# Patient Record
Sex: MALE | Race: WHITE | Employment: OTHER | URBAN - METROPOLITAN AREA
[De-identification: names, ages, dates, MRNs, and addresses within clinical notes are randomized per-mention and may not be internally consistent; named-entity substitution may affect disease eponyms.]

---

## 2017-06-28 ENCOUNTER — HOSPITAL ENCOUNTER (OUTPATIENT)
Dept: NON INVASIVE DIAGNOSTICS | Age: 82
Discharge: HOME | End: 2017-06-28
Attending: INTERNAL MEDICINE
Payer: MEDICARE

## 2017-06-28 DIAGNOSIS — I48.0 PAROXYSMAL ATRIAL FIBRILLATION (HCC): ICD-10-CM

## 2017-06-28 PROCEDURE — 93225 XTRNL ECG REC<48 HRS REC: CPT

## 2017-07-03 NOTE — PROCEDURES
Beto Kahn, [address]       Name:  Bree Clarke   MR#:  [id number]   :  1926   Account #:  [de-identified]        Date of Adm:  2017       PROCEDURE: Holter monitor    INDICATIONS: Bradycardia, chronic atrial fibrillation. FINDINGS: The Holter monitor was carried out from 2017 to   2017. At the end of the recording the patient did not return a   diary. The patient is in atrial fibrillation. There are pauses. At times the   rate goes up and has a maximum rate of 160 to 170. There are   occasional episodes of rates in the 30's, to 36 to 38. There are pauses   as well. These are approximately 3 seconds in duration. Bradycardia   occurs in the 30's. There are times the rate is in the 150's to 160's. Also, pauses in the range of 2.2 seconds occur as well. Maximum   pause is approximately 3.0 seconds. SUMMARY:   1. Atrial fibrillation seen throughout the recording. 2.  Rapid ventricular response with rates 160 to 170 occur. 3.  Multiple pauses 2.5 seconds, one approximately 3.0 second pause. 4.  Rates in the 30's.          MD ANISHA Knight / JOSE   D:  2017   08:25   T:  2017   21:35   Job #:  [id number]     Dr. Radha Osman

## 2017-07-10 ENCOUNTER — APPOINTMENT (OUTPATIENT)
Dept: GENERAL RADIOLOGY | Age: 82
End: 2017-07-10
Attending: INTERNAL MEDICINE
Payer: MEDICARE

## 2017-07-10 ENCOUNTER — HOSPITAL ENCOUNTER (OUTPATIENT)
Dept: CARDIAC CATH/INVASIVE PROCEDURES | Age: 82
Setting detail: OBSERVATION
Discharge: HOME | End: 2017-07-11
Attending: INTERNAL MEDICINE | Admitting: INTERNAL MEDICINE
Payer: MEDICARE

## 2017-07-10 PROBLEM — R00.1 BRADYCARDIA: Status: ACTIVE | Noted: 2017-07-10

## 2017-07-10 LAB — INR BLD: 1.3 (ref 0.9–1.2)

## 2017-07-10 PROCEDURE — 77030018729 HC ELECTRD DEFIB PAD CARD -B

## 2017-07-10 PROCEDURE — C1894 INTRO/SHEATH, NON-LASER: HCPCS

## 2017-07-10 PROCEDURE — 77030018836 HC SOL IRR NACL ICUM -A

## 2017-07-10 PROCEDURE — 77030002996 HC SUT SLK J&J -A

## 2017-07-10 PROCEDURE — 77030031139 HC SUT VCRL2 J&J -A

## 2017-07-10 PROCEDURE — C1898 LEAD, PMKR, OTHER THAN TRANS: HCPCS

## 2017-07-10 PROCEDURE — 74011636320 HC RX REV CODE- 636/320

## 2017-07-10 PROCEDURE — L3670 SO ACRO/CLAV CAN WEB PRE OTS: HCPCS

## 2017-07-10 PROCEDURE — 77030011640 HC PAD GRND REM COVD -A

## 2017-07-10 PROCEDURE — 74011000250 HC RX REV CODE- 250

## 2017-07-10 PROCEDURE — 71010 XR CHEST PORT: CPT

## 2017-07-10 PROCEDURE — 74011250637 HC RX REV CODE- 250/637: Performed by: INTERNAL MEDICINE

## 2017-07-10 PROCEDURE — 74011250636 HC RX REV CODE- 250/636

## 2017-07-10 PROCEDURE — 99218 HC RM OBSERVATION: CPT

## 2017-07-10 PROCEDURE — 85610 PROTHROMBIN TIME: CPT

## 2017-07-10 PROCEDURE — C1892 INTRO/SHEATH,FIXED,PEEL-AWAY: HCPCS

## 2017-07-10 PROCEDURE — 99153 MOD SED SAME PHYS/QHP EA: CPT

## 2017-07-10 PROCEDURE — C1786 PMKR, SINGLE, RATE-RESP: HCPCS

## 2017-07-10 PROCEDURE — 74011250636 HC RX REV CODE- 250/636: Performed by: INTERNAL MEDICINE

## 2017-07-10 PROCEDURE — 74011000250 HC RX REV CODE- 250: Performed by: INTERNAL MEDICINE

## 2017-07-10 RX ORDER — POTASSIUM CHLORIDE 750 MG/1
10 TABLET, FILM COATED, EXTENDED RELEASE ORAL DAILY
Status: DISCONTINUED | OUTPATIENT
Start: 2017-07-11 | End: 2017-07-11 | Stop reason: HOSPADM

## 2017-07-10 RX ORDER — MIDAZOLAM HYDROCHLORIDE 1 MG/ML
INJECTION, SOLUTION INTRAMUSCULAR; INTRAVENOUS
Status: COMPLETED
Start: 2017-07-10 | End: 2017-07-10

## 2017-07-10 RX ORDER — SODIUM CHLORIDE 9 MG/ML
100 INJECTION, SOLUTION INTRAVENOUS CONTINUOUS
Status: DISPENSED | OUTPATIENT
Start: 2017-07-10 | End: 2017-07-10

## 2017-07-10 RX ORDER — CEFAZOLIN SODIUM IN 0.9 % NACL 2 G/100 ML
2 PLASTIC BAG, INJECTION (ML) INTRAVENOUS ONCE
Status: COMPLETED | OUTPATIENT
Start: 2017-07-10 | End: 2017-07-10

## 2017-07-10 RX ORDER — FENTANYL CITRATE 50 UG/ML
INJECTION, SOLUTION INTRAMUSCULAR; INTRAVENOUS
Status: COMPLETED
Start: 2017-07-10 | End: 2017-07-10

## 2017-07-10 RX ORDER — HEPARIN SODIUM 200 [USP'U]/100ML
500 INJECTION, SOLUTION INTRAVENOUS ONCE
Status: DISCONTINUED | OUTPATIENT
Start: 2017-07-10 | End: 2017-07-10

## 2017-07-10 RX ORDER — ACETAMINOPHEN 325 MG/1
650 TABLET ORAL
Status: DISCONTINUED | OUTPATIENT
Start: 2017-07-10 | End: 2017-07-11 | Stop reason: HOSPADM

## 2017-07-10 RX ORDER — METOPROLOL TARTRATE 25 MG/1
25 TABLET, FILM COATED ORAL 2 TIMES DAILY
Status: DISCONTINUED | OUTPATIENT
Start: 2017-07-10 | End: 2017-07-11 | Stop reason: HOSPADM

## 2017-07-10 RX ORDER — SODIUM CHLORIDE 0.9 % (FLUSH) 0.9 %
5-10 SYRINGE (ML) INJECTION AS NEEDED
Status: DISCONTINUED | OUTPATIENT
Start: 2017-07-10 | End: 2017-07-11 | Stop reason: HOSPADM

## 2017-07-10 RX ORDER — CEFAZOLIN SODIUM IN 0.9 % NACL 2 G/100 ML
2 PLASTIC BAG, INJECTION (ML) INTRAVENOUS EVERY 8 HOURS
Status: COMPLETED | OUTPATIENT
Start: 2017-07-10 | End: 2017-07-11

## 2017-07-10 RX ORDER — TERAZOSIN 5 MG/1
5 CAPSULE ORAL DAILY
Status: DISCONTINUED | OUTPATIENT
Start: 2017-07-11 | End: 2017-07-11 | Stop reason: HOSPADM

## 2017-07-10 RX ORDER — CEFAZOLIN SODIUM IN 0.9 % NACL 2 G/100 ML
PLASTIC BAG, INJECTION (ML) INTRAVENOUS
Status: DISCONTINUED
Start: 2017-07-10 | End: 2017-07-10 | Stop reason: ALTCHOICE

## 2017-07-10 RX ORDER — FINASTERIDE 5 MG/1
5 TABLET, FILM COATED ORAL DAILY
Status: DISCONTINUED | OUTPATIENT
Start: 2017-07-11 | End: 2017-07-11 | Stop reason: HOSPADM

## 2017-07-10 RX ORDER — LIDOCAINE HYDROCHLORIDE AND EPINEPHRINE 10; 10 MG/ML; UG/ML
INJECTION, SOLUTION INFILTRATION; PERINEURAL
Status: DISCONTINUED
Start: 2017-07-10 | End: 2017-07-10 | Stop reason: ALTCHOICE

## 2017-07-10 RX ORDER — BACITRACIN 50000 [IU]/1
INJECTION, POWDER, FOR SOLUTION INTRAMUSCULAR
Status: COMPLETED
Start: 2017-07-10 | End: 2017-07-10

## 2017-07-10 RX ORDER — MIDAZOLAM HYDROCHLORIDE 1 MG/ML
.5-2 INJECTION, SOLUTION INTRAMUSCULAR; INTRAVENOUS
Status: DISCONTINUED | OUTPATIENT
Start: 2017-07-10 | End: 2017-07-10 | Stop reason: ALTCHOICE

## 2017-07-10 RX ORDER — GABAPENTIN 300 MG/1
600 CAPSULE ORAL 3 TIMES DAILY
Status: DISCONTINUED | OUTPATIENT
Start: 2017-07-10 | End: 2017-07-11 | Stop reason: HOSPADM

## 2017-07-10 RX ORDER — LIDOCAINE HYDROCHLORIDE 10 MG/ML
INJECTION INFILTRATION; PERINEURAL
Status: DISCONTINUED
Start: 2017-07-10 | End: 2017-07-10 | Stop reason: ALTCHOICE

## 2017-07-10 RX ORDER — SODIUM CHLORIDE 0.9 % (FLUSH) 0.9 %
5-10 SYRINGE (ML) INJECTION EVERY 8 HOURS
Status: DISCONTINUED | OUTPATIENT
Start: 2017-07-10 | End: 2017-07-11 | Stop reason: HOSPADM

## 2017-07-10 RX ORDER — DIGOXIN 125 MCG
0.25 TABLET ORAL DAILY
Status: DISCONTINUED | OUTPATIENT
Start: 2017-07-11 | End: 2017-07-11 | Stop reason: HOSPADM

## 2017-07-10 RX ORDER — HEPARIN SODIUM 200 [USP'U]/100ML
INJECTION, SOLUTION INTRAVENOUS
Status: DISCONTINUED
Start: 2017-07-10 | End: 2017-07-10 | Stop reason: ALTCHOICE

## 2017-07-10 RX ORDER — LIDOCAINE HYDROCHLORIDE AND EPINEPHRINE 10; 10 MG/ML; UG/ML
0-30 INJECTION, SOLUTION INFILTRATION; PERINEURAL ONCE
Status: COMPLETED | OUTPATIENT
Start: 2017-07-10 | End: 2017-07-10

## 2017-07-10 RX ORDER — BACITRACIN 50000 [IU]/1
50000 INJECTION, POWDER, FOR SOLUTION INTRAMUSCULAR ONCE
Status: COMPLETED | OUTPATIENT
Start: 2017-07-10 | End: 2017-07-10

## 2017-07-10 RX ORDER — FUROSEMIDE 20 MG/1
20 TABLET ORAL DAILY
Status: DISCONTINUED | OUTPATIENT
Start: 2017-07-11 | End: 2017-07-11 | Stop reason: HOSPADM

## 2017-07-10 RX ORDER — FENTANYL CITRATE 50 UG/ML
25-50 INJECTION, SOLUTION INTRAMUSCULAR; INTRAVENOUS
Status: DISCONTINUED | OUTPATIENT
Start: 2017-07-10 | End: 2017-07-10 | Stop reason: ALTCHOICE

## 2017-07-10 RX ORDER — HEPARIN SODIUM 200 [USP'U]/100ML
500 INJECTION, SOLUTION INTRAVENOUS ONCE
Status: COMPLETED | OUTPATIENT
Start: 2017-07-10 | End: 2017-07-10

## 2017-07-10 RX ADMIN — FENTANYL CITRATE 50 MCG: 50 INJECTION, SOLUTION INTRAMUSCULAR; INTRAVENOUS at 13:40

## 2017-07-10 RX ADMIN — CEFAZOLIN 2 G: 10 INJECTION, POWDER, FOR SOLUTION INTRAVENOUS; PARENTERAL at 13:39

## 2017-07-10 RX ADMIN — GABAPENTIN 600 MG: 300 CAPSULE ORAL at 20:03

## 2017-07-10 RX ADMIN — Medication 10 ML: at 17:58

## 2017-07-10 RX ADMIN — CEFAZOLIN 2 G: 10 INJECTION, POWDER, FOR SOLUTION INTRAVENOUS; PARENTERAL at 20:03

## 2017-07-10 RX ADMIN — BACITRACIN 50000 UNITS: 50000 INJECTION, POWDER, FOR SOLUTION INTRAMUSCULAR at 14:17

## 2017-07-10 RX ADMIN — GABAPENTIN 600 MG: 300 CAPSULE ORAL at 17:56

## 2017-07-10 RX ADMIN — IOPAMIDOL 30 ML: 755 INJECTION, SOLUTION INTRAVENOUS at 13:59

## 2017-07-10 RX ADMIN — LIDOCAINE HYDROCHLORIDE,EPINEPHRINE BITARTRATE 220 MG: 10; .01 INJECTION, SOLUTION INFILTRATION; PERINEURAL at 14:01

## 2017-07-10 RX ADMIN — MIDAZOLAM HYDROCHLORIDE 2 MG: 1 INJECTION, SOLUTION INTRAMUSCULAR; INTRAVENOUS at 14:21

## 2017-07-10 RX ADMIN — HEPARIN SODIUM 1000 UNITS: 200 INJECTION, SOLUTION INTRAVENOUS at 14:00

## 2017-07-10 RX ADMIN — Medication 10 ML: at 22:24

## 2017-07-10 RX ADMIN — MIDAZOLAM HYDROCHLORIDE 2 MG: 1 INJECTION, SOLUTION INTRAMUSCULAR; INTRAVENOUS at 13:42

## 2017-07-10 RX ADMIN — MIDAZOLAM HYDROCHLORIDE 2 MG: 1 INJECTION INTRAMUSCULAR; INTRAVENOUS at 13:42

## 2017-07-10 RX ADMIN — FENTANYL CITRATE 50 MCG: 50 INJECTION, SOLUTION INTRAMUSCULAR; INTRAVENOUS at 14:21

## 2017-07-10 RX ADMIN — MIDAZOLAM HYDROCHLORIDE 1 MG: 1 INJECTION, SOLUTION INTRAMUSCULAR; INTRAVENOUS at 14:24

## 2017-07-10 RX ADMIN — ACETAMINOPHEN 650 MG: 325 TABLET, FILM COATED ORAL at 22:24

## 2017-07-10 RX ADMIN — METOPROLOL TARTRATE 25 MG: 25 TABLET ORAL at 17:56

## 2017-07-10 NOTE — IP AVS SNAPSHOT
Summary of Care Report The Summary of Care report has been created to help improve care coordination. Users with access to [employer] or [facility] [facility] (Web-based application) may access additional patient information including the Discharge Summary. If you are not currently a [address] user and need more information, please call the number listed below in the [place] [address] section and ask to be connected with Medical Records. Facility Information Name Address Phone [address] [address] [phone] Patient Information Patient Name Sex  Kathrin Stein ([id number]) Male 1926 Discharge Information Admitting Provider Service Area Unit Kenji Chiu MD / Eleni Pratt [facility] / [phone] Discharge Provider Discharge Date/Time Discharge Disposition Destination (none) 2017 (Pending) AHR (none) Patient Language Language ENGLISH [13] Hospital Problems as of 2017  Reviewed: 10/19/2016 10:04 AM by Anil Barnes MD  
  
  
  
 Class Noted - Resolved Last Modified POA Active Problems Bradycardia  7/10/2017 - Present 7/10/2017 by Kenji Chiu MD Unknown Entered by Kenji Chiu MD  
  
Non-Hospital Problems as of 2017  Reviewed: 10/19/2016 10:04 AM by Anil Barnes MD  
  
  
  
 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  
 Overview Signed 5/20/2014 10:08 AM by Alicia Barrientos, DO Necessitates use of iris hooks Neuropathic pain  12/8/2014 - Present 12/8/2014 by Fercho Hernandez Entered by Fercho Hernandez Heel pain  12/8/2014 - Present 12/8/2014 by Fercho Hernandez Entered by Fercho Hernandez Ataxia  12/8/2014 - Present 12/8/2014 by Fercho Hernandez Entered by Fercho Hernandez Pre-diabetes  12/8/2014 - Present 12/8/2014 by Fercho Hernandez Entered by Fercho Hernandez Radiculopathy of lumbosacral region  1/28/2015 - Present 1/28/2015 by Fercho Hernandez Entered by Fercho Hernandez Lumbar radiculopathy  1/28/2015 - Present 1/28/2015 by Darryn Luevano MD  
  Entered by Darryn Luevano MD  
  Spinal stenosis of lumbar region at multiple levels  6/7/2015 - Present 6/7/2015 by Fercho Hernandez Entered by Fercho Hernandez   Degenerative joint disease of right hip  12/2/2015 - Present 12/2/2015 by Hira Clemente MD  
  Entered by Hira Clemente MD  
  Primary localized osteoarthritis of right hip  12/2/2015 - Present 12/2/2015 by Hira Clemente MD  
  Entered by Hira Clemente MD  
  Idiopathic small and large fiber sensory neuropathy  4/21/2016 - Present 4/21/2016 by Darryn Luevano MD  
  Entered by Darryn Luevano MD  
  Diabetic peripheral neuropathy associated with type 2 diabetes mellitus ([facility]  4/21/2016 - Present 4/21/2016 by Darryn Luevano MD  
  Entered by Darryn Luevano MD  
  B12 deficiency  4/21/2016 - Present 4/21/2016 by Darryn Luevano MD  
  Entered by Darryn Luevano MD  
  Benign tumor of spinal meninges ([facility]  4/21/2016 - Present 4/21/2016 by Darryn Luevano MD  
  Entered by Darryn Luevano MD  
  Memory change  4/22/2016 - Present 4/22/2016 by Darryn Luevano MD  
  Entered by Darryn Luevano MD  
  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  
  Entered by Darryn Luevano MD  
 Vitamin D deficiency  4/22/2016 - Present 4/22/2016 by Raven Hernandez MD  
  Entered by Raven Hernandez MD  
  Bile duct dilatation on CT 9 mm  8/31/2016 - Present 8/31/2016 by Curt Garcia MD  
  Entered by Curt Garcia MD  
  RUQ pain  8/31/2016 - Present 8/31/2016 by Curt Garcia MD  
  Entered by Curt Garcia MD  
  
You are allergic to the following No active allergies Current Discharge Medication List  
  
START taking these medications Dose & Instructions Dispensing Information Comments  
 cephALEXin 500 mg capsule Commonly known as:  Frieda [place] Dose:  500 mg Take 1 Cap by mouth three (3) times daily for 3 days. Quantity:  9 Cap Refills:  0 CONTINUE these medications which have NOT CHANGED Dose & Instructions Dispensing Information Comments  
 ferrous sulfate 325 mg (65 mg iron) EC tablet Commonly known as:  IRON Refills:  0  
   
 finasteride 5 mg tablet Commonly known as:  PROSCAR Dose:  5 mg Take 5 mg by mouth daily. Refills:  0  
   
 furosemide 20 mg tablet Commonly known as:  LASIX Dose:  20 mg  
20 mg daily. Refills:  5  
   
 gabapentin 300 mg capsule Commonly known as:  NEURONTIN Dose:  600 mg Take 2 Caps by mouth three (3) times daily. Quantity:  180 Cap Refills:  5 LANOXIN 0.25 mg tablet Generic drug:  digoxin Dose:  0.25 mg Take 0.25 mg by mouth daily. Refills:  0  
   
 metoprolol tartrate 50 mg tablet Commonly known as:  LOPRESSOR Dose:  25 mg Take 25 mg by mouth two (2) times a day. Refills:  0  
   
 potassium chloride SA 10 mEq capsule Commonly known as:  Yaquelin [place] Dose:  10 mEq Take 10 mEq by mouth daily. Refills:  0  
   
 terazosin 5 mg capsule Commonly known as:  HYTRIN Dose:  5 mg Take 5 mg by mouth daily. Indications: HYPERTENSION Refills:  0  
   
 VITAMIN D3 1,000 unit Cap Generic drug:  cholecalciferol Take  by mouth daily. Refills:  0  
   
 warfarin 5 mg tablet Commonly known as:  COUMADIN Dose:  5 mg Take 5 mg by mouth daily. Refills:  0 Follow-up Information Follow up With Details Comments Contact Brenda Farfan MD   Patient can only remember the practice name and not the physician Discharge Instructions Cardiology Discharge Summary Patient ID: 
Kylah Russo [id number] 
7 y.o. 
2/18/1926 Admit Date: 7/10/2017 Discharge Date: 7/11/2017 Admitting Physician: Vadim Merrill MD  
 
 
 
Patient Instructions:  
 
Can resume warfarin tonight Referenced discharge instructions provided by nursing for diet and activity. Follow-up with Dr John Bryant nurse Connie Flanagan  2 weeks [phone] for pacer check. Can f/u with Dr Carlos Sweeney after that Signed: 
Vadim Merrill MD 
7/11/2017 
9:00 AM 
DISCHARGE INSTRUCTIONS FOR PATIENTS WITH PACEMAKERS 1. Remember to call for an appointment in 2 weeks [phone] to check healing and implant programming. 2. Medic Alert Bracelets are available from your pharmacist to wear at all times if you choose to wear one. 3. Carry your ID card for pacemaker with you at all times. This card will be given to you in the hospital or mailed to you. 4. The pacemaker will bulge slightly under your skin. The bulge will decrease in size over the next few weeks. Please notify the doctor's office if you notice any of the following around your site: A.  A bruise that does not go away. B.  Soreness or yellow, green, or brown drainage from the site. C. Any swelling from the site. D. If you have a fever of 100 degrees or higher that lasts for a few days. INCISION CARE 1.  Leave Steri strips  over your site until it starts to fall off, usually in a few weeks. 2.  You may shower after 3 days as long as your incision isnt submerged or directly sprayed upon until well healed. 3.  For comfort, wear loose fitting clothing. 4.  Report any signs of infection, fever, pain, swelling, redness, oozing, or heat at site especially if these symptoms increase after the first 3 to 4 days. ACTIVITY PRECAUTIONS 1. Avoid rough contact with the implant site. 2. No driving for 14 days. 3. Avoid lifting your arm over your head, carrying anything on the affected side, or lifting over 10 pounds for 30 days. For the first 2 days only bend your arm at the elbow. 4. Any extreme activity such as golf, weight lifting or exercise biking should be restricted for 60 days. 5. Do not carry objects by holding them against your implant site. 6.  No shooting rifles or any type of gun with the affected shoulder permanently. SPECIAL PRECAUTIONS 1. You should avoid all strong magnetic fields, such as arc welding, large transformers, large motors. 2.  You may not have an MRI which uses a strong magnet to take pictures. 3.  Treatments or surgery that requires diathermy or electrocautery should be discussed with your doctor before scheduled. 4. Avoid radio frequency transmitters, including radar. 5. Advise dentist or other medical personnel you see that you have a pacemaker. 6.  Cell phones and microwave oven use is okay. 7.  If you plan to move or take a trip to a new area, the doctor's office will give you a name of a doctor to contact for any problems. ANTIBIOTIC THERAPY During the first 8 weeks after your pacemaker insertion, you may need antibiotics before any dental work or certain tests or operations. Let the dentist or doctor who is caring for you know that you have had an implanted device. You will be given a prescription for a prophylactic antibiotic called cephalexin to take for a few days after your procedure. Chart Review Routing History Recipient Method Report Sent By Gali Alcocer MD  
Phone: [phone] In Basket Notes/Transcriptions Bela Ferrera RN [[id number]] 5/14/2014  5:14 PM 05/14/2014 [address] [place] [address],  Fax: [phone] Phone: [phone] Fax Notes/Transcriptions Bela Ferrera RN [[id number]] 5/14/2014  5:14 PM 05/14/2014 Janell Rivera MD  
Phone: [phone] In Basket Notes/Transcriptions Demetrio Bowman RN [[id number]] 5/21/2014 10:25 AM 05/21/2014 [address] [place] [address],  Fax: [phone] Phone: [phone] Fax Notes/Transcriptions Demetrio Bowman RN [[id number]] 5/21/2014 10:25 AM 05/21/2014 [address] [place] [address] Fax: [phone] Fax [facility] CUSTOM LAB REPORT FABRICIO Baptiste [[id number]] 12/4/2015  2:09 PM 12/02/2015

## 2017-07-10 NOTE — IP AVS SNAPSHOT
[address] [facility] 
[phone] Patient: Zuleima Stanford MRN: [id number] :1926 You are allergic to the following No active allergies Recent Documentation Height Weight BMI Smoking Status 1.778 m 72.6 kg 22.96 kg/m2 Former Smoker Emergency Contacts Name Discharge Info Relation Home Work Mobile Ollie Sher 45. CAREGIVER [3] Spouse [3] [phone] About your hospitalization You were admitted on:  July 10, 2017 You last received care in the:  [facility] You were discharged on:  2017 Unit phone number:  [phone] Why you were hospitalized Your primary diagnosis was:  Not on File Your diagnoses also included:  Bradycardia Providers Seen During Your Hospitalizations Provider Role Specialty Primary office phone Lucienne Holstein, MD Attending Provider Cardiology [phone] Your Primary Care Physician (PCP) Primary Care Physician Office Phone Office Fax OTHER, PHYS ** None ** ** None ** Follow-up Information Follow up With Details Comments Contact Info Nerissa Farfan MD   Patient can only remember the practice name and not the physician Current Discharge Medication List  
  
START taking these medications Dose & Instructions Dispensing Information Comments Morning Noon Evening Bedtime  
 cephALEXin 500 mg capsule Commonly known as:  Zeferino Clonts Your last dose was: Your next dose is:    
   
   
 Dose:  500 mg Take 1 Cap by mouth three (3) times daily for 3 days. Quantity:  9 Cap Refills:  0 CONTINUE these medications which have NOT CHANGED Dose & Instructions Dispensing Information Comments Morning Noon Evening Bedtime  
 ferrous sulfate 325 mg (65 mg iron) EC tablet Commonly known as:  IRON  
   
 Your last dose was: Your next dose is:    
   
   
  Refills:  0  
     
   
   
   
  
 finasteride 5 mg tablet Commonly known as:  PROSCAR Your last dose was: Your next dose is:    
   
   
 Dose:  5 mg Take 5 mg by mouth daily. Refills:  0  
     
   
   
   
  
 furosemide 20 mg tablet Commonly known as:  LASIX Your last dose was: Your next dose is:    
   
   
 Dose:  20 mg  
20 mg daily. Refills:  5  
     
   
   
   
  
 gabapentin 300 mg capsule Commonly known as:  NEURONTIN Your last dose was: Your next dose is:    
   
   
 Dose:  600 mg Take 2 Caps by mouth three (3) times daily. Quantity:  180 Cap Refills:  5 LANOXIN 0.25 mg tablet Generic drug:  digoxin Your last dose was: Your next dose is:    
   
   
 Dose:  0.25 mg Take 0.25 mg by mouth daily. Refills:  0  
     
   
   
   
  
 metoprolol tartrate 50 mg tablet Commonly known as:  LOPRESSOR Your last dose was: Your next dose is:    
   
   
 Dose:  25 mg Take 25 mg by mouth two (2) times a day. Refills:  0  
     
   
   
   
  
 potassium chloride SA 10 mEq capsule Commonly known as:  Chun Bancroft Your last dose was: Your next dose is:    
   
   
 Dose:  10 mEq Take 10 mEq by mouth daily. Refills:  0  
     
   
   
   
  
 terazosin 5 mg capsule Commonly known as:  HYTRIN Your last dose was: Your next dose is:    
   
   
 Dose:  5 mg Take 5 mg by mouth daily. Indications: HYPERTENSION Refills:  0  
     
   
   
   
  
 VITAMIN D3 1,000 unit Cap Generic drug:  cholecalciferol Your last dose was: Your next dose is: Take  by mouth daily. Refills:  0  
     
   
   
   
  
 warfarin 5 mg tablet Commonly known as:  COUMADIN Your last dose was: Your next dose is:    
   
   
 Dose:  5 mg Take 5 mg by mouth daily. Refills:  0 Where to Get Your Medications Information on where to get these meds will be given to you by the nurse or doctor. ! Ask your nurse or doctor about these medications  
  cephALEXin 500 mg capsule Discharge Instructions Cardiology Discharge Summary Patient ID: 
Alton Greenfield [id number] 
9 y.o. 
2/18/1926 Admit Date: 7/10/2017 Discharge Date: 7/11/2017 Admitting Physician: Courtney Boothe MD  
 
 
 
Patient Instructions:  
 
Can resume warfarin tonight Referenced discharge instructions provided by nursing for diet and activity. Follow-up with Dr Deyanira Messina nurse Shelli Pride  2 weeks [phone] for pacer check. Can f/u with Dr Yanci Yuan after that Signed: 
Courtney Boothe MD 
7/11/2017 
9:00 AM 
DISCHARGE INSTRUCTIONS FOR PATIENTS WITH PACEMAKERS 1. Remember to call for an appointment in 2 weeks [phone] to check healing and implant programming. 2. Medic Alert Bracelets are available from your pharmacist to wear at all times if you choose to wear one. 3. Carry your ID card for pacemaker with you at all times. This card will be given to you in the hospital or mailed to you. 4. The pacemaker will bulge slightly under your skin. The bulge will decrease in size over the next few weeks. Please notify the doctor's office if you notice any of the following around your site: A.  A bruise that does not go away. B.  Soreness or yellow, green, or brown drainage from the site. C. Any swelling from the site. D. If you have a fever of 100 degrees or higher that lasts for a few days. INCISION CARE 1.  Leave Steri strips  over your site until it starts to fall off, usually in a few weeks. 2.  You may shower after 3 days as long as your incision isnt submerged or directly sprayed upon until well healed. 3.  For comfort, wear loose fitting clothing.  
4.  Report any signs of infection, fever, pain, swelling, redness, oozing, or heat at site especially if these symptoms increase after the first 3 to 4 days. ACTIVITY PRECAUTIONS 1. Avoid rough contact with the implant site. 2. No driving for 14 days. 3. Avoid lifting your arm over your head, carrying anything on the affected side, or lifting over 10 pounds for 30 days. For the first 2 days only bend your arm at the elbow. 4. Any extreme activity such as golf, weight lifting or exercise biking should be restricted for 60 days. 5. Do not carry objects by holding them against your implant site. 6.  No shooting rifles or any type of gun with the affected shoulder permanently. SPECIAL PRECAUTIONS 1. You should avoid all strong magnetic fields, such as arc welding, large transformers, large motors. 2.  You may not have an MRI which uses a strong magnet to take pictures. 3.  Treatments or surgery that requires diathermy or electrocautery should be discussed with your doctor before scheduled. 4. Avoid radio frequency transmitters, including radar. 5. Advise dentist or other medical personnel you see that you have a pacemaker. 6.  Cell phones and microwave oven use is okay. 7.  If you plan to move or take a trip to a new area, the doctor's office will give you a name of a doctor to contact for any problems. ANTIBIOTIC THERAPY During the first 8 weeks after your pacemaker insertion, you may need antibiotics before any dental work or certain tests or operations. Let the dentist or doctor who is caring for you know that you have had an implanted device. You will be given a prescription for a prophylactic antibiotic called cephalexin to take for a few days after your procedure. Discharge Orders None General Information Please provide this summary of care documentation to your next provider. Patient Signature:  ____________________________________________________________ Date:  ____________________________________________________________  
  
Ruby Faes Provider Signature:  ____________________________________________________________ Date:  ____________________________________________________________

## 2017-07-10 NOTE — IP AVS SNAPSHOT
Current Discharge Medication List  
  
START taking these medications Dose & Instructions Dispensing Information Comments Morning Noon Evening Bedtime  
 cephALEXin 500 mg capsule Commonly known as:  Arcenio Veenaely Your last dose was: Your next dose is:    
   
   
 Dose:  500 mg Take 1 Cap by mouth three (3) times daily for 3 days. Quantity:  9 Cap Refills:  0 CONTINUE these medications which have NOT CHANGED Dose & Instructions Dispensing Information Comments Morning Noon Evening Bedtime  
 ferrous sulfate 325 mg (65 mg iron) EC tablet Commonly known as:  IRON Your last dose was: Your next dose is:    
   
   
  Refills:  0  
     
   
   
   
  
 finasteride 5 mg tablet Commonly known as:  PROSCAR Your last dose was: Your next dose is:    
   
   
 Dose:  5 mg Take 5 mg by mouth daily. Refills:  0  
     
   
   
   
  
 furosemide 20 mg tablet Commonly known as:  LASIX Your last dose was: Your next dose is:    
   
   
 Dose:  20 mg  
20 mg daily. Refills:  5  
     
   
   
   
  
 gabapentin 300 mg capsule Commonly known as:  NEURONTIN Your last dose was: Your next dose is:    
   
   
 Dose:  600 mg Take 2 Caps by mouth three (3) times daily. Quantity:  180 Cap Refills:  5 LANOXIN 0.25 mg tablet Generic drug:  digoxin Your last dose was: Your next dose is:    
   
   
 Dose:  0.25 mg Take 0.25 mg by mouth daily. Refills:  0  
     
   
   
   
  
 metoprolol tartrate 50 mg tablet Commonly known as:  LOPRESSOR Your last dose was: Your next dose is:    
   
   
 Dose:  25 mg Take 25 mg by mouth two (2) times a day. Refills:  0  
     
   
   
   
  
 potassium chloride SA 10 mEq capsule Commonly known as:  Delphia Nohemy Your last dose was: Your next dose is:    
   
   
 Dose:  10 mEq Take 10 mEq by mouth daily. Refills:  0  
     
   
   
   
  
 terazosin 5 mg capsule Commonly known as:  HYTRIN Your last dose was: Your next dose is:    
   
   
 Dose:  5 mg Take 5 mg by mouth daily. Indications: HYPERTENSION Refills:  0  
     
   
   
   
  
 VITAMIN D3 1,000 unit Cap Generic drug:  cholecalciferol Your last dose was: Your next dose is: Take  by mouth daily. Refills:  0  
     
   
   
   
  
 warfarin 5 mg tablet Commonly known as:  COUMADIN Your last dose was: Your next dose is:    
   
   
 Dose:  5 mg Take 5 mg by mouth daily. Refills:  0 Where to Get Your Medications Information on where to get these meds will be given to you by the nurse or doctor. ! Ask your nurse or doctor about these medications  
  cephALEXin 500 mg capsule

## 2017-07-11 VITALS
TEMPERATURE: 98.6 F | HEIGHT: 70 IN | HEART RATE: 86 BPM | SYSTOLIC BLOOD PRESSURE: 131 MMHG | WEIGHT: 160 LBS | BODY MASS INDEX: 22.9 KG/M2 | OXYGEN SATURATION: 95 % | RESPIRATION RATE: 16 BRPM | DIASTOLIC BLOOD PRESSURE: 60 MMHG

## 2017-07-11 LAB
ATRIAL RATE: 87 BPM
CALCULATED R AXIS, ECG10: 82 DEGREES
CALCULATED T AXIS, ECG11: 28 DEGREES
DIAGNOSIS, 93000: NORMAL
Q-T INTERVAL, ECG07: 354 MS
QRS DURATION, ECG06: 100 MS
QTC CALCULATION (BEZET), ECG08: 398 MS
VENTRICULAR RATE, ECG03: 76 BPM

## 2017-07-11 PROCEDURE — 74011250636 HC RX REV CODE- 250/636: Performed by: INTERNAL MEDICINE

## 2017-07-11 PROCEDURE — 99218 HC RM OBSERVATION: CPT

## 2017-07-11 PROCEDURE — 74011250637 HC RX REV CODE- 250/637: Performed by: INTERNAL MEDICINE

## 2017-07-11 PROCEDURE — 93005 ELECTROCARDIOGRAM TRACING: CPT

## 2017-07-11 RX ORDER — CEPHALEXIN 500 MG/1
500 CAPSULE ORAL 3 TIMES DAILY
Qty: 9 CAP | Refills: 0 | Status: SHIPPED | OUTPATIENT
Start: 2017-07-11 | End: 2017-07-14

## 2017-07-11 RX ADMIN — CEFAZOLIN 2 G: 10 INJECTION, POWDER, FOR SOLUTION INTRAVENOUS; PARENTERAL at 09:11

## 2017-07-11 RX ADMIN — DIGOXIN 0.25 MG: 125 TABLET ORAL at 08:41

## 2017-07-11 RX ADMIN — FUROSEMIDE 20 MG: 20 TABLET ORAL at 08:41

## 2017-07-11 RX ADMIN — GABAPENTIN 600 MG: 300 CAPSULE ORAL at 08:41

## 2017-07-11 RX ADMIN — POTASSIUM CHLORIDE 10 MEQ: 750 TABLET, FILM COATED, EXTENDED RELEASE ORAL at 08:41

## 2017-07-11 RX ADMIN — METOPROLOL TARTRATE 25 MG: 25 TABLET ORAL at 08:41

## 2017-07-11 RX ADMIN — FINASTERIDE 5 MG: 5 TABLET, FILM COATED ORAL at 08:41

## 2017-07-11 RX ADMIN — TERAZOSIN HYDROCHLORIDE 5 MG: 5 CAPSULE ORAL at 08:41

## 2017-07-11 NOTE — DISCHARGE INSTRUCTIONS
Cardiology Discharge Summary     Patient ID:  Salbador Wilkins  [id number]  48 y.o.  2/18/1926    Admit Date: 7/10/2017    Discharge Date: 7/11/2017     Admitting Physician: Jodi Salgado MD         Patient Instructions:     Can resume warfarin tonight     Referenced discharge instructions provided by nursing for diet and activity. Follow-up with Dr Maria E Gagnon nurse Gaviota Leggett  2 weeks [phone] for pacer check. Can f/u with Dr Carlyn Ashraf after that     Signed:  Jodi Salgado MD  7/11/2017  9:00 AM  DISCHARGE INSTRUCTIONS FOR PATIENTS WITH PACEMAKERS    1. Remember to call for an appointment in 2 weeks [phone] to check healing and implant programming. 2. Medic Alert Bracelets are available from your pharmacist to wear at all times if you choose to wear one. 3. Carry your ID card for pacemaker with you at all times. This card will be given to you in the hospital or mailed to you. 4. The pacemaker will bulge slightly under your skin. The bulge will decrease in size over the next few weeks. Please notify the doctor's office if you notice any of the following around your site:   A.  A bruise that does not go away. B.  Soreness or yellow, green, or brown drainage from the site. C. Any swelling from the site. D. If you have a fever of 100 degrees or higher that lasts for a few days. INCISION CARE       1.  Leave Steri strips  over your site until it starts to fall off, usually in a few weeks. 2.  You may shower after 3 days as long as your incision isnt submerged or directly sprayed upon until well healed. 3.  For comfort, wear loose fitting clothing. 4.  Report any signs of infection, fever, pain, swelling, redness, oozing, or heat at site especially if these symptoms increase after the first 3 to 4 days. ACTIVITY PRECAUTIONS     1. Avoid rough contact with the implant site. 2. No driving for 14 days.   3. Avoid lifting your arm over your head, carrying anything on the affected side, or lifting over 10 pounds for 30 days. For the first 2 days only bend your arm at the elbow. 4. Any extreme activity such as golf, weight lifting or exercise biking should be restricted for 60 days. 5. Do not carry objects by holding them against your implant site. 6.  No shooting rifles or any type of gun with the affected shoulder permanently. SPECIAL PRECAUTIONS     1. You should avoid all strong magnetic fields, such as arc welding, large transformers, large motors. 2.  You may not have an MRI which uses a strong magnet to take pictures. 3.  Treatments or surgery that requires diathermy or electrocautery should be discussed with your doctor before scheduled. 4. Avoid radio frequency transmitters, including radar. 5. Advise dentist or other medical personnel you see that you have a pacemaker. 6.  Cell phones and microwave oven use is okay. 7.  If you plan to move or take a trip to a new area, the doctor's office will give you a name of a doctor to contact for any problems. ANTIBIOTIC THERAPY    During the first 8 weeks after your pacemaker insertion, you may need antibiotics before any dental work or certain tests or operations. Let the dentist or doctor who is caring for you know that you have had an implanted device. You will be given a prescription for a prophylactic antibiotic called cephalexin to take for a few days after your procedure.

## 2017-07-11 NOTE — PROGRESS NOTES
Discharge instructions reviewed with patient and family. Rx reviewed with patient and family. Patient to be transported to front entrance via wheelchair, escorted by volunteer services.

## 2017-07-11 NOTE — PROGRESS NOTES
Patient with standby assist to bathroom, refuses to use cane--very unsteady gait, holding onto objects and door frame to get to bathroom, refusing to let nurse touch him for assistance. RN in room awaiting patient to finish bathroom needs, RN entered bathroom as she visualized patient attempting to get up off toilet. Patient demands nurse leave room and refuses assistance. Will continue to monitor patient on telemetry from outside of room. Patient states \"get out, I will call you when I need you. \"

## 2017-07-11 NOTE — CARDIO/PULMONARY
CP REHAB NOTE    Chart Review: Patient admitted for pacemaker  S/p pacemaker placement 7/10/2017  Medical History: Afib, CAD, HTN, [place]  Former Smoker    Met with patient for post op teaching. Printed material given and discussed re: pacemakers, pacemaker discharge instructions and the TLC diet. Discussed post pacemaker instructions including: restrictions for the affected arm (no raising the arm above shoulder level, no heavy lifting for 30 days), monitoring for infection, avoiding impacts/pressure to the site, avoiding extreme activities, when to call the doctor, use of cell phones and microwaves and avoiding strong magnetic fields. Patient seemed frustrated by sling and it was unclear how much of teaching patient was going to follow. Daughter and wife came in at end of teaching session so all post procedure directions were reviewed with family members. Daughter and wife seemed to clearly understand all teaching. No further questions at this time.

## 2017-07-12 NOTE — OP NOTES
[address] [place] [address] [address]   OP NOTE       Name:  Satish Diana   MR#:  [id number]   :  1926   Account #:  [de-identified]    Surgery Date:  07/10/2017   Date of Adm:  07/10/2017       PREOPERATIVE DIAGNOS:      POSTOPERATIVE DIAGNOS:      PROCEDURES PERFORMED:     1. Left subclavian venogram.   2. Single chamber ventricular permanent pacemaker placement   surgery. SURGEON: Justin Giordano MD    INDICATIONS: Nonreversible, symptomatic sick sinus syndrome and   atrioventricular node disease with chronic atrial fibrillation. ESTIMATED BLOOD LOSS: <30 cc     SPECIMENS REMOVED: None    ANESTHESIA:  Concious sedation     DESCRIPTION OF PROCEDURE: The patient was brought to the   cardiac catheterization lab in a fasting state and after informed consent   was obtained. Electrocardiographic and hemodynamic monitoring were   performed. Sedation was performed by the nurse who was in constant   attendance throughout the procedure. IV Versed and IV fentanyl were   administered from 1:42 p.m. until 2:34 p.m. under the constant   supervision of the attending physician. Lidocaine 1% with epinephrine   was used to anesthetize the left chest wall implant site. The pocket was formed the usual fashion and axillary venous access   was obtained using a micropuncture needle. An 8-Upper sorbian peel away   sheath was placed in the left subclavian vein using standard technique. The right ventricular lead was subsequently advanced to the right   ventricular apex under fluoroscopic guidance. After appropriate   parameters were obtained, the lead was screwed in place in the usual   fashion. This was a Medtronic Model #5076, 58 cm lead, Serial   [id number]. Parameters obtained included an R-wave of 8.9 mV,   impedance of 594 ohms and pacing threshold of 0.6 volts at 0.5 msec   pulse width.  The lead was subsequently anchored to the pocket floor   using 2-0 silk sutures at the anchor sleeve. The pulse generator was then connected to the lead and placed in the   pocket after hemostasis was confirmed. This was a Medtronic Model   Advisa SR MRI device, Model [id number], Serial [id number]. Vigorous irrigation with antibiotic solution was then performed in the   pacemaker pocket, and the pocket was closed using two running 2-0   Vicryl layers with the more superficial layer of running 4-0 Vicryl in a   subcuticular fashion. Final fluoroscopic check revealed adequate   redundancy of the lead and absence of a pneumothorax. Final settings   were in the VVI-R mode with a low rate limit of 60 and an upper rate   limit of 120. PLAN: The patient was to have a followup portable chest x-ray   immediately post procedure and a pacemaker check the morning after   the procedure. The patient was also to receive IV Ancef post   procedure and post discharge followup was to be in approximately 10   days for routine wound and device check.          Saroj Fish MD      [address] [place] [address] / [place]   D:  07/12/2017   17:31   T:  07/12/2017   19:20   Job #:  [id number]

## 2017-10-27 ENCOUNTER — OFFICE VISIT (OUTPATIENT)
Dept: NEUROLOGY | Age: 82
End: 2017-10-27

## 2017-10-27 VITALS
WEIGHT: 175 LBS | DIASTOLIC BLOOD PRESSURE: 62 MMHG | SYSTOLIC BLOOD PRESSURE: 129 MMHG | BODY MASS INDEX: 25.05 KG/M2 | HEART RATE: 96 BPM | HEIGHT: 70 IN | RESPIRATION RATE: 16 BRPM | OXYGEN SATURATION: 97 %

## 2017-10-27 DIAGNOSIS — G60.8 IDIOPATHIC SMALL AND LARGE FIBER SENSORY NEUROPATHY: ICD-10-CM

## 2017-10-27 DIAGNOSIS — M54.17 RADICULOPATHY OF LUMBOSACRAL REGION: ICD-10-CM

## 2017-10-27 DIAGNOSIS — D32.1 BENIGN TUMOR OF SPINAL MENINGES (HCC): ICD-10-CM

## 2017-10-27 DIAGNOSIS — R41.3 MEMORY CHANGE: ICD-10-CM

## 2017-10-27 DIAGNOSIS — F02.80 DEMENTIA OF THE ALZHEIMER'S TYPE WITH LATE ONSET WITHOUT BEHAVIORAL DISTURBANCE (HCC): ICD-10-CM

## 2017-10-27 DIAGNOSIS — E11.42 DIABETIC PERIPHERAL NEUROPATHY ASSOCIATED WITH TYPE 2 DIABETES MELLITUS (HCC): Primary | ICD-10-CM

## 2017-10-27 DIAGNOSIS — G30.1 DEMENTIA OF THE ALZHEIMER'S TYPE WITH LATE ONSET WITHOUT BEHAVIORAL DISTURBANCE (HCC): ICD-10-CM

## 2017-10-27 RX ORDER — GABAPENTIN 600 MG/1
600 TABLET ORAL 3 TIMES DAILY
Qty: 100 TAB | Refills: 11 | Status: SHIPPED | OUTPATIENT
Start: 2017-10-27 | End: 2019-01-02 | Stop reason: SDUPTHER

## 2017-10-27 NOTE — MR AVS SNAPSHOT
Visit Information Date & Time Provider Department Dept. Phone Encounter #  
 10/27/2017  1:40 PM Jordan Bartlett MD [facility] at [facility] [phone] [id number] Follow-up Instructions Return in about 1 year (around 10/27/2018). Upcoming Health Maintenance Date Due  
 LIPID PANEL Q1 2/18/1926 FOOT EXAM Q1 2/18/1936 MICROALBUMIN Q1 2/18/1936 EYE EXAM RETINAL OR DILATED Q1 2/18/1936 DTaP/Tdap/Td series (1 - Tdap) 2/18/1947 ZOSTER VACCINE AGE 60> 12/18/1985 GLAUCOMA SCREENING Q2Y 2/18/1991 Pneumococcal 65+ Low/Medium Risk (1 of 2 - PCV13) 2/18/1991 MEDICARE YEARLY EXAM 2/18/1991 HEMOGLOBIN A1C Q6M 10/21/2016 INFLUENZA AGE 9 TO ADULT 8/1/2017 Allergies as of 10/27/2017  Review Complete On: 10/27/2017 By: Jordan Bartlett MD  
 No Known Allergies Current Immunizations  Never Reviewed No immunizations on file. Not reviewed this visit You Were Diagnosed With   
  
 Codes Comments Diabetic peripheral neuropathy associated with type 2 diabetes mellitus ([facility] [address]    -  Primary ICD-10-CM: E11.42 
ICD-9-CM: 250.60, 357.2 Idiopathic small and large fiber sensory neuropathy     ICD-10-CM: G60.8 ICD-9-CM: 356.4 Benign tumor of spinal meninges (HCC)     ICD-10-CM: D32.1 ICD-9-CM: 225.4 Radiculopathy of lumbosacral region     ICD-10-CM: M54.17 ICD-9-CM: 724.4 Dementia of the Alzheimer's type with late onset without behavioral disturbance     ICD-10-CM: G30.1, F02.80 ICD-9-CM: 331.0, 294.10 Memory change     ICD-10-CM: R41.3 ICD-9-CM: 780.93 Vitals BP Pulse Resp Height(growth percentile) Weight(growth percentile) SpO2  
 129/62 96 16 5' 10\" (1.778 m) 175 lb (79.4 kg) 97% BMI Smoking Status 25.11 kg/m2 Former Smoker Vitals History BMI and BSA Data Body Mass Index Body Surface Area  
 25.11 kg/m 2 1.98 m 2 Preferred Pharmacy Pharmacy Name Phone [facility], [address] [phone] Your Updated Medication List  
  
   
This list is accurate as of: 10/27/17  2:12 PM.  Always use your most recent med list.  
  
  
  
  
 ferrous sulfate 325 mg (65 mg iron) EC tablet Commonly known as:  IRON  
  
 finasteride 5 mg tablet Commonly known as:  PROSCAR Take 5 mg by mouth daily. furosemide 20 mg tablet Commonly known as:  LASIX  
20 mg daily. gabapentin 600 mg tablet Commonly known as:  NEURONTIN Take 1 Tab by mouth three (3) times daily. LANOXIN 0.25 mg tablet Generic drug:  digoxin Take 0.25 mg by mouth daily. metoprolol tartrate 50 mg tablet Commonly known as:  LOPRESSOR Take 25 mg by mouth two (2) times a day. potassium chloride SA 10 mEq capsule Commonly known as:  Rayma Flake Take 10 mEq by mouth daily. terazosin 5 mg capsule Commonly known as:  HYTRIN Take 5 mg by mouth daily. Indications: HYPERTENSION  
  
 VITAMIN D3 1,000 unit Cap Generic drug:  cholecalciferol Take  by mouth daily. warfarin 5 mg tablet Commonly known as:  COUMADIN Take 5 mg by mouth daily. Prescriptions Sent to Pharmacy Refills  
 gabapentin (NEURONTIN) 600 mg tablet 11 Sig: Take 1 Tab by mouth three (3) times daily. Class: Normal  
 Pharmacy: [address], [address] Ph #: [phone] Route: Oral  
  
Follow-up Instructions Return in about 1 year (around 10/27/2018). Patient Instructions A Healthy Lifestyle: Care Instructions Your Care Instructions A healthy lifestyle can help you feel good, stay at a healthy weight, and have plenty of energy for both work and play. A healthy lifestyle is something you can share with your whole family.  
A healthy lifestyle also can lower your risk for serious health problems, such as high blood pressure, heart disease, and diabetes. You can follow a few steps listed below to improve your health and the health of your family. Follow-up care is a key part of your treatment and safety. Be sure to make and go to all appointments, and call your doctor if you are having problems. It's also a good idea to know your test results and keep a list of the medicines you take. How can you care for yourself at home? · Do not eat too much sugar, fat, or fast foods. You can still have dessert and treats now and then. The goal is moderation. · Start small to improve your eating habits. Pay attention to portion sizes, drink less juice and soda pop, and eat more fruits and vegetables. ¨ Eat a healthy amount of food. A 3-ounce serving of meat, for example, is about the size of a deck of cards. Fill the rest of your plate with vegetables and whole grains. ¨ Limit the amount of soda and sports drinks you have every day. Drink more water when you are thirsty. ¨ Eat at least 5 servings of fruits and vegetables every day. It may seem like a lot, but it is not hard to reach this goal. A serving or helping is 1 piece of fruit, 1 cup of vegetables, or 2 cups of leafy, raw vegetables. Have an apple or some carrot sticks as an afternoon snack instead of a candy bar. Try to have fruits and/or vegetables at every meal. 
· Make exercise part of your daily routine. You may want to start with simple activities, such as walking, bicycling, or slow swimming. Try to be active 30 to 60 minutes every day. You do not need to do all 30 to 60 minutes all at once. For example, you can exercise 3 times a day for 10 or 20 minutes. Moderate exercise is safe for most people, but it is always a good idea to talk to your doctor before starting an exercise program. 
· Keep moving. Amanda Bun the lawn, work in the garden, or "[employer] Take the stairs instead of the elevator at work. · If you smoke, quit. People who smoke have an increased risk for heart attack, stroke, cancer, and other lung illnesses. Quitting is hard, but there are ways to boost your chance of quitting tobacco for good. ¨ Use nicotine gum, patches, or lozenges. ¨ Ask your doctor about stop-smoking programs and medicines. ¨ Keep trying. In addition to reducing your risk of diseases in the future, you will notice some benefits soon after you stop using tobacco. If you have shortness of breath or asthma symptoms, they will likely get better within a few weeks after you quit. · Limit how much alcohol you drink. Moderate amounts of alcohol (up to 2 drinks a day for men, 1 drink a day for women) are okay. But drinking too much can lead to liver problems, high blood pressure, and other health problems. Family health If you have a family, there are many things you can do together to improve your health. · Eat meals together as a family as often as possible. · Eat healthy foods. This includes fruits, vegetables, lean meats and dairy, and whole grains. · Include your family in your fitness plan. Most people think of activities such as jogging or tennis as the way to fitness, but there are many ways you and your family can be more active. Anything that makes you breathe hard and gets your heart pumping is exercise. Here are some tips: 
¨ Walk to do errands or to take your child to school or the bus. ¨ Go for a family bike ride after dinner instead of watching TV. Where can you learn more? Go to http://gwen-arturo.info/. Enter O080 in the search box to learn more about \"A Healthy Lifestyle: Care Instructions. \" Current as of: May 12, 2017 Content Version: 11.4 © 5598-3488 [employer]. Care instructions adapted under license by [employer] (which disclaims liability or warranty for this information).  If you have questions about a medical condition or this instruction, always ask your healthcare professional. [address] any warranty or liability for your use of this information. Introducing [facility]! Dear Luisa Nelson: 
Thank you for requesting a [employer] account. Our records indicate that you have previously registered for a [employer] account but its currently inactive. Please call our [employer] support line at [phone]. Additional Information If you have questions, please visit the Frequently Asked Questions section of the [employer] website at [email or web address] Remember, [employer] is NOT to be used for urgent needs. For medical emergencies, dial 911. Now available from your iPhone and Android! Please provide this summary of care documentation to your next provider. Your primary care clinician is listed as Hola Wilson. If you have any questions after today's visit, please call [phone].

## 2017-10-27 NOTE — PATIENT INSTRUCTIONS

## 2017-10-27 NOTE — LETTER
10/27/2017 9:13 PM 
 
Patient:  Vince Goldberg YOB: 1926 Date of Visit: 10/27/2017 Dear No Recipients: Thank you for referring Mr. Vince Goldberg to me for evaluation/treatment. Below are the relevant portions of my assessment and plan of care. Neurology Follow Up Evaluation REFERRED BY: 
Travis Leggett MD 
 
CHIEF COMPLAINT: Spinal mass in lumbar region, increasing numbness in feet, back pain, left pain, recent falls Subjective:  
 
Vince Goldberg is a 80 y.o. right-handed  male, referred by Dr. Mariel Henson, for evaluation of multiple neurological problems and follow-up for his spinal tumor and severe leg weakness and numbness and back pain which is still symptomatic and may be a little worse, but he is able to deal with it. We suggested maybe a repeat MRI scan but he is not sure he wants one because he does not want any type of surgery anyway. . His last office visit was on her ago. He had a MRI of the lumbar spine performed on 05/05/16 that shows \"Unchanged intradural extramedullary brightly enhancing mass at L3. 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 The mass creates moderate to severe spinal stenosis at this level. Unchanged grade 1 anterolisthesis of L4 on L5 with moderate spinal stenosis and mild left neural foraminal narrowing. Unchanged mild spinal stenosis at L5-S1 with mild right neural foraminal narrowing. Unchanged extrahepatic biliary dilatation. \"  He has a known spinal mass in the lumbar region at L3 measuring 13 x 11 x 11 mm in size with severe canal stenosis. He complaints of increasing numbness in his feet that is causing him to limp when he walks. He ambulates with cane, but can walk without assistance for short period of time. He is borderline diabetic, as his last hga1c checked on 04/21/16 was 5.8.  He continues to take Neurontin 600mg three times a day, which is controlling his neuropathic pain. This and refills were sent in for the patient today. He seems to be tolerating this medication with no side effects at this time. He also uses Capsin cream OTC for foot pain relief. He complaints of back and leg pain. He has history of right total hip replacement that helped his hip pain significantly. His bowel and bladder function remain stable at this time He is celebrating his 77 anniversary with his wife today. We discussed possible new medications, but the patient feels that he has enough now and will just continue his current dose of medication Patient reports of recent fall that he suspects is caused from Cymbalta. He denies any broken bones or head trauma from this fall. He discontinued taking the Cymbalta, and noticed that he did not have any falls after discontinuing the medication. Patient is currently on chronic anticoagulation because of atrial fibrillation. He is consistent on his coumadin therapy and has been taking this for 15 years. Patient notes of an incident when he began to have pain on the right side of his rib cage. He began sweating, and reported to the ED for evaluation. He had an abdominal ultrasound performed that was normal.  
He also has some memory loss this seems to be getting slowly worse we will check metabolic parameters trying to find a treatable cause. His wife feels his memory is stable and that his walking is actually getting better after having his hip replacement. He has had no recent imaging of the brain. He's had no new focal weakness, sensory loss, headache, meningismus, trauma, or other precipitating neurological or new neurological symptoms. Past Medical History:  
Diagnosis Date  Arrhythmia   
 hx of A-fib  Arthritis  Atrial fibrillation ([address] Dr Ainsley Benito [phone]  CAD (coronary artery disease)  [place] (hard of hearing) Bilateral hearing aids  Hypertension  Skin cancer Left ear Past Surgical History:  
Procedure Laterality Date  HX CATARACT REMOVAL    
 HX HERNIA REPAIR    
 x2  
 HX OTHER SURGICAL    
 lesion removed right ear  TOTAL KNEE ARTHROPLASTY Left Family History Problem Relation Age of Onset  Heart Disease Mother  Heart Disease Father  COPD Father  Cancer Brother  Other Brother MVA Social History Substance Use Topics  Smoking status: Former Smoker Quit date: 11/23/1970  Smokeless tobacco: Never Used Comment: cigar smoker  Alcohol use No  
   
 
Current Outpatient Prescriptions:  
  gabapentin (NEURONTIN) 600 mg tablet, Take 1 Tab by mouth three (3) times daily. , Disp: 100 Tab, Rfl: 11 
  warfarin (COUMADIN) 5 mg tablet, Take 5 mg by mouth daily. , Disp: , Rfl:  
  ferrous sulfate (IRON) 325 mg (65 mg iron) EC tablet, , Disp: , Rfl: 0 
  furosemide (LASIX) 20 mg tablet, 20 mg daily. , Disp: , Rfl: 5 
  potassium chloride SA (MICRO-K) 10 mEq capsule, Take 10 mEq by mouth daily. , Disp: , Rfl:  
  digoxin (LANOXIN) 0.25 mg tablet, Take 0.25 mg by mouth daily. , Disp: , Rfl:  
  metoprolol (LOPRESSOR) 50 mg tablet, Take 25 mg by mouth two (2) times a day., Disp: , Rfl:  
  terazosin (HYTRIN) 5 mg capsule, Take 5 mg by mouth daily. Indications: HYPERTENSION, Disp: , Rfl:  
  finasteride (PROSCAR) 5 mg tablet, Take 5 mg by mouth daily. , Disp: , Rfl:  
  cholecalciferol (VITAMIN D3) 1,000 unit cap, Take  by mouth daily. , Disp: , Rfl:  
 
 
 
No Known Allergies Review of Systems: A comprehensive review of systems was negative except for: Neurological: positive for coordination problems, gait problems and weakness Vitals:  
 10/27/17 1332 BP: 129/62 Pulse: 96  
Resp: 16 SpO2: 97% Weight: 175 lb (79.4 kg) Height: 5' 10\" (1.778 m) Objective: I 
 
 
NEUROLOGICAL EXAM: 
Appearance:   The patient is fairly developed and nourished, provides a coherent history and is in no acute distress. Mental Status: Oriented to Year and month but not the date of the month, place and person, and the president, cognitive function is abnormal and speech is fluent and no aphasia or dysarthria. Mood and affect appropriate. Cranial Nerves:  Intact visual fields. Fundi are benign. STEVE, EOM's full, no nystagmus, no ptosis. Facial sensation is normal. Corneal reflexes are not tested. Facial movement is symmetric. Hearing is normal bilaterally. Palate is midline with normal sternocleidomastoid and trapezius muscles are normal. Tongue is midline. Neck without meningismus or bruits Motor:  4/5 strength in upper and lower proximal and distal muscles. Normal bulk and tone. No fasciculations. Reflexes:  Deep tendon reflexes 1+/4 and symmetrical. 
No babinski or clonus present Sensory:  Abnormal to touch, pinprick and vibration And temperature in both lower extremities to knee level. DSS is intact Gait:  Abnormal gait as patient needs bilateral canes for ambulation and moves very slowly due to his arthritis and ataxia and neuropathy with sensory ataxia Tremor:  No tremor noted. Cerebellar:  Abnormal Romberg and tandem cerebellar signs present. Neurovascular:  Abnormal heart sounds and irregular rhythm, peripheral pulses decreased, and no carotid bruits. Assessment: ICD-10-CM ICD-9-CM 1. Diabetic peripheral neuropathy associated with type 2 diabetes mellitus ([facility]) E11.42 250.60 gabapentin (NEURONTIN) 600 mg tablet  
  357.2 2. Idiopathic small and large fiber sensory neuropathy G60.8 356.4 gabapentin (NEURONTIN) 600 mg tablet 3. Benign tumor of spinal meninges ([facility]) D32.1 225.4 gabapentin (NEURONTIN) 600 mg tablet 4. Radiculopathy of lumbosacral region M54.17 724.4 gabapentin (NEURONTIN) 600 mg tablet 5. Dementia of the Alzheimer's type with late onset without behavioral disturbance G30.1 331.0 gabapentin (NEURONTIN) 600 mg tablet F02.80 294.10 6. Memory change R41.3 780.93 gabapentin (NEURONTIN) 600 mg tablet Plan: For his increasing numbness in feet, I informed him this could be due to being borderline diabetic. He was advised to watch his diet to control his blood sugar levels. He will continue with Neurontin for nerve pain relief. His medication were renewed for the patient again today and he will continue these because he is having no side effects on the medication and it does provide efficacy. He has a spinal cord tumor that is somewhat ominous, but has not changed on previous imaging, and he does not want imaging done now because he does not want any surgery or intervention at this time unless he worsens. Patient was informed that his back pain in chronic due to history of lumbar stenosis. I encouraged patient to discontinue Cymbalta as this was causing him to fall more frequently. His spinal tumor looks stable, no change in the last year, we will repeat his scan again in one years time or earlier if needed, and he will call if any problem or any progression of neurological symptoms in his legs His MRI scan was reviewed personally on the PACS system today, his old records reviewed also on the chart, and we again discussed the fact that at his age and his with his heart condition he is not a surgical candidate according to neurosurgery and his cardiologist 
He is encouraged to continue his exercise program, and take his vitamins and vitamin D on a daily basis and his medications as prescribed His old records were reviewed in detail, MRI scan is reviewed in detail, these were done on the PACS system. And I concluded that he indeed has a mass in his lumbar spine and needs to be reevaluated to rule out cauda equina syndrome or paraplegia. All medications were reviewed and reconciled in the office today. He is to try to remain both mentally and physically active and try to exercise as much as he can. Patient will return to the office in 12 months for follow up evaluation. Patient is encouraged to call the office sooner if his symptoms persist.  
  
CC: Chika Dumont MD 
FAX: [phone] This note will not be viewable in [address] If you have questions, please do not hesitate to call me. I look forward to following Mr. Evaristo Whaley along with you. Sincerely, Nic Raygoza MD

## 2017-10-28 NOTE — PROGRESS NOTES
Neurology Follow Up Evaluation   REFERRED BY:  Cruz Laguerre MD    CHIEF COMPLAINT: Spinal mass in lumbar region, increasing numbness in feet, back pain, left pain, recent falls       Subjective:     David Bates is a 80 y.o. right-handed  male, referred by Dr. Saint Bitters, for evaluation of multiple neurological problems and follow-up for his spinal tumor and severe leg weakness and numbness and back pain which is still symptomatic and may be a little worse, but he is able to deal with it. We suggested maybe a repeat MRI scan but he is not sure he wants one because he does not want any type of surgery anyway. . His last office visit was on her ago. He had a MRI of the lumbar spine performed on 05/05/16 that shows \"Unchanged intradural extramedullary brightly enhancing mass at L3. 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 The mass creates moderate to severe spinal stenosis at this level. Unchanged grade 1 anterolisthesis of L4 on L5 with moderate spinal stenosis and mild left neural foraminal narrowing. Unchanged mild spinal stenosis at L5-S1 with mild right neural foraminal narrowing. Unchanged extrahepatic biliary dilatation. \"  He has a known spinal mass in the lumbar region at L3 measuring 13 x 11 x 11 mm in size with severe canal stenosis. He complaints of increasing numbness in his feet that is causing him to limp when he walks. He ambulates with cane, but can walk without assistance for short period of time. He is borderline diabetic, as his last hga1c checked on 04/21/16 was 5.8. He continues to take Neurontin 600mg three times a day, which is controlling his neuropathic pain. This and refills were sent in for the patient today. He seems to be tolerating this medication with no side effects at this time. He also uses Capsin cream OTC for foot pain relief. He complaints of back and leg pain.  He has history of right total hip replacement that helped his hip pain significantly. His bowel and bladder function remain stable at this time  He is celebrating his 77 anniversary with his wife today. We discussed possible new medications, but the patient feels that he has enough now and will just continue his current dose of medication  Patient reports of recent fall that he suspects is caused from Cymbalta. He denies any broken bones or head trauma from this fall. He discontinued taking the Cymbalta, and noticed that he did not have any falls after discontinuing the medication. Patient is currently on chronic anticoagulation because of atrial fibrillation. He is consistent on his coumadin therapy and has been taking this for 15 years. Patient notes of an incident when he began to have pain on the right side of his rib cage. He began sweating, and reported to the ED for evaluation. He had an abdominal ultrasound performed that was normal.   He also has some memory loss this seems to be getting slowly worse we will check metabolic parameters trying to find a treatable cause. His wife feels his memory is stable and that his walking is actually getting better after having his hip replacement. He has had no recent imaging of the brain. He's had no new focal weakness, sensory loss, headache, meningismus, trauma, or other precipitating neurological or new neurological symptoms.     Past Medical History:   Diagnosis Date    Arrhythmia     hx of A-fib    Arthritis     Atrial fibrillation ([facility])     Dr Juan Laughter [phone]    CAD (coronary artery disease)     [place] (hard of hearing)     Bilateral hearing aids    Hypertension     Skin cancer     Left ear      Past Surgical History:   Procedure Laterality Date    HX CATARACT REMOVAL      HX HERNIA REPAIR      x2    HX OTHER SURGICAL      lesion removed right ear    TOTAL KNEE ARTHROPLASTY      Left     Family History   Problem Relation Age of Onset    Heart Disease Mother     Heart Disease Father    [facility] COPD Father     Cancer Brother     Other Brother      MVA      Social History   Substance Use Topics    Smoking status: Former Smoker     Quit date: 11/23/1970    Smokeless tobacco: Never Used      Comment: cigar smoker    Alcohol use No         Current Outpatient Prescriptions:     gabapentin (NEURONTIN) 600 mg tablet, Take 1 Tab by mouth three (3) times daily. , Disp: 100 Tab, Rfl: 11    warfarin (COUMADIN) 5 mg tablet, Take 5 mg by mouth daily. , Disp: , Rfl:     ferrous sulfate (IRON) 325 mg (65 mg iron) EC tablet, , Disp: , Rfl: 0    furosemide (LASIX) 20 mg tablet, 20 mg daily. , Disp: , Rfl: 5    potassium chloride SA (MICRO-K) 10 mEq capsule, Take 10 mEq by mouth daily. , Disp: , Rfl:     digoxin (LANOXIN) 0.25 mg tablet, Take 0.25 mg by mouth daily. , Disp: , Rfl:     metoprolol (LOPRESSOR) 50 mg tablet, Take 25 mg by mouth two (2) times a day., Disp: , Rfl:     terazosin (HYTRIN) 5 mg capsule, Take 5 mg by mouth daily. Indications: HYPERTENSION, Disp: , Rfl:     finasteride (PROSCAR) 5 mg tablet, Take 5 mg by mouth daily. , Disp: , Rfl:     cholecalciferol (VITAMIN D3) 1,000 unit cap, Take  by mouth daily. , Disp: , Rfl:         No Known Allergies     Review of Systems:  A comprehensive review of systems was negative except for: Neurological: positive for coordination problems, gait problems and weakness   Vitals:    10/27/17 1332   BP: 129/62   Pulse: 96   Resp: 16   SpO2: 97%   Weight: 175 lb (79.4 kg)   Height: 5' 10\" (1.778 m)     Objective:     I      NEUROLOGICAL EXAM:  Appearance: The patient is fairly developed and nourished, provides a coherent history and is in no acute distress. Mental Status: Oriented to Year and month but not the date of the month, place and person, and the president, cognitive function is abnormal and speech is fluent and no aphasia or dysarthria. Mood and affect appropriate. Cranial Nerves:  Intact visual fields. Fundi are benign.  STEVE, EOM's full, no nystagmus, no ptosis. Facial sensation is normal. Corneal reflexes are not tested. Facial movement is symmetric. Hearing is normal bilaterally. Palate is midline with normal sternocleidomastoid and trapezius muscles are normal. Tongue is midline. Neck without meningismus or bruits   Motor:  4/5 strength in upper and lower proximal and distal muscles. Normal bulk and tone. No fasciculations. Reflexes:  Deep tendon reflexes 1+/4 and symmetrical.  No babinski or clonus present   Sensory:  Abnormal to touch, pinprick and vibration And temperature in both lower extremities to knee level. DSS is intact   Gait:  Abnormal gait as patient needs bilateral canes for ambulation and moves very slowly due to his arthritis and ataxia and neuropathy with sensory ataxia   Tremor:  No tremor noted. Cerebellar:  Abnormal Romberg and tandem cerebellar signs present. Neurovascular:  Abnormal heart sounds and irregular rhythm, peripheral pulses decreased, and no carotid bruits. Assessment:       ICD-10-CM ICD-9-CM    1. Diabetic peripheral neuropathy associated with type 2 diabetes mellitus ([facility]) E11.42 250.60 gabapentin (NEURONTIN) 600 mg tablet     357.2    2. Idiopathic small and large fiber sensory neuropathy G60.8 356.4 gabapentin (NEURONTIN) 600 mg tablet   3. Benign tumor of spinal meninges ([facility]) D32.1 225.4 gabapentin (NEURONTIN) 600 mg tablet   4. Radiculopathy of lumbosacral region M54.17 724.4 gabapentin (NEURONTIN) 600 mg tablet   5. 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 Memory change R41.3 780.93 gabapentin (NEURONTIN) 600 mg tablet       Plan: For his increasing numbness in feet, I informed him this could be due to being borderline diabetic. He was advised to watch his diet to control his blood sugar levels. He will continue with Neurontin for nerve pain relief.   His medication were renewed for the patient again today and he will continue these because he is having no side effects on the medication and it does provide efficacy. He has a spinal cord tumor that is somewhat ominous, but has not changed on previous imaging, and he does not want imaging done now because he does not want any surgery or intervention at this time unless he worsens. Patient was informed that his back pain in chronic due to history of lumbar stenosis. I encouraged patient to discontinue Cymbalta as this was causing him to fall more frequently. His spinal tumor looks stable, no change in the last year, we will repeat his scan again in one years time or earlier if needed, and he will call if any problem or any progression of neurological symptoms in his legs  His MRI scan was reviewed personally on the PACS system today, his old records reviewed also on the chart, and we again discussed the fact that at his age and his with his heart condition he is not a surgical candidate according to neurosurgery and his cardiologist  He is encouraged to continue his exercise program, and take his vitamins and vitamin D on a daily basis and his medications as prescribed  His old records were reviewed in detail, MRI scan is reviewed in detail, these were done on the PACS system. And I concluded that he indeed has a mass in his lumbar spine and needs to be reevaluated to rule out cauda equina syndrome or paraplegia. All medications were reviewed and reconciled in the office today. He is to try to remain both mentally and physically active and try to exercise as much as he can. Patient will return to the office in 12 months for follow up evaluation. Patient is encouraged to call the office sooner if his symptoms persist.      CC: Berto Powell MD  FAX: [phone]    This note will not be viewable in [address]

## 2019-01-02 ENCOUNTER — OFFICE VISIT (OUTPATIENT)
Dept: NEUROLOGY | Age: 84
End: 2019-01-02

## 2019-01-02 VITALS
HEIGHT: 70 IN | HEART RATE: 86 BPM | SYSTOLIC BLOOD PRESSURE: 122 MMHG | WEIGHT: 182 LBS | DIASTOLIC BLOOD PRESSURE: 78 MMHG | OXYGEN SATURATION: 98 % | BODY MASS INDEX: 26.05 KG/M2

## 2019-01-02 DIAGNOSIS — E11.42 DIABETIC PERIPHERAL NEUROPATHY ASSOCIATED WITH TYPE 2 DIABETES MELLITUS (HCC): Primary | ICD-10-CM

## 2019-01-02 DIAGNOSIS — M48.061 SPINAL STENOSIS OF LUMBAR REGION AT MULTIPLE LEVELS: ICD-10-CM

## 2019-01-02 RX ORDER — GABAPENTIN 800 MG/1
800 TABLET ORAL 3 TIMES DAILY
COMMUNITY
Start: 2018-11-01

## 2019-01-02 NOTE — PATIENT INSTRUCTIONS
Office Policies · Phone calls/patient messages: Please allow up to 24 hours for someone in the office to contact you about your call or message. Be mindful your provider may be out of the office or your message may require further review. We encourage you to use [employer] for your messages as this is a faster, more efficient way to communicate with our office· Medication Refills: 
Prescription medications require up to 48 business hours to process. We encourage you to use [facility] for your refills. For controlled medications: Please allow up to 72 business hours to process. Certain medications may require you to  a written prescription at our office. NO narcotic/controlled medications will be prescribed after 4pm Monday through Friday or on weekends· Form/Paperwork Completion: 
Please note there is a $25 fee for all paperwork completed by our providers. We ask that you allow 7-14 business days. Pre-payment is due prior to picking up/faxing the completed form. You may also download your forms to [employer] to have your doctor print off. Diabetic Neuropathy: Care Instructions Your Care Instructions When you have diabetes, your blood sugar level may get too high. Over time, high blood sugar levels can damage nerves. This is called diabetic neuropathy. Nerve damage can cause pain, burning, tingling, and numbness and may leave you feeling weak. The feet are often affected. When you have nerve damage in your feet, you cannot feel your feet and toes as well as normal and may not notice cuts or sores. Even a small injury can lead to a serious infection. It is very important that you follow your doctor's advice on foot care. Sometimes diabetes damages nerves that help the body function. If this happens, your blood pressure, sweating, digestion, and urination might be affected.  Your doctor may give you a target blood sugar level that is higher or lower than you are used to. Try to keep your blood sugar very close to this target level to prevent more damage. Follow-up care is a key part of your treatment and safety. Be sure to make and go to all appointments, and call your doctor if you are having problems. It's also a good idea to know your test results and keep a list of the medicines you take. How can you care for yourself at home? · Take your medicines exactly as prescribed. Call your doctor if you think you are having a problem with your medicine. It is very important that you take your insulin or diabetes pills as your doctor tells you. · Try to keep blood sugar at your target level. ? Eat a variety of healthy foods, with carbohydrate spread out in your meals. A dietitian can help you plan meals. ? Try to get at least 30 minutes of exercise on most days. ? Check your blood sugar as many times each day as your doctor recommends. · Take and record your blood pressure at home if your doctor tells you to. Learn the importance of the two measures of blood pressure (such as 130 over 80, or 130/80). To take your blood pressure at home: ? Ask your doctor to check your blood pressure monitor to be sure it is accurate and the cuff fits you. Also ask your doctor to watch you to make sure that you are using it right. ? Do not use medicine known to raise blood pressure (such as some nasal decongestant sprays) before taking your blood pressure. ? Avoid taking your blood pressure if you have just exercised or are nervous or upset. Rest at least 15 minutes before you take a reading. · Take pain medicines exactly as directed. ? If the doctor gave you a prescription medicine for pain, take it as prescribed. ? If you are not taking a prescription pain medicine, ask your doctor if you can take an over-the-counter medicine. · Do not smoke.  Smoking can increase your chance for a heart attack or stroke. If you need help quitting, talk to your doctor about stop-smoking programs and medicines. These can increase your chances of quitting for good. · Limit alcohol to 2 drinks a day for men and 1 drink a day for women. Too much alcohol can cause health problems. · Eat small meals often, rather than 2 or 3 large meals a day. To care for your feet · Prevent injury by wearing shoes at all times, even when you are indoors. · Do foot care as part of your daily routine. Wash your feet and then rub lotion on your feet, but not between your toes. Use a handheld mirror or magnifying mirror to inspect your feet for blisters, cuts, cracks, or sores. · Have your toenails trimmed and filed straight across. · Wear shoes and socks that fit well. Soft shoes that have good support and that fit well (such as tennis shoes) are best for your feet. · Check your shoes for any loose objects or rough edges before you put them on. · Ask your doctor to check your feet during each visit. Your doctor may notice a foot problem you have missed. · Get early treatment for any foot problem, even a minor one. When should you call for help? Call your doctor now or seek immediate medical care if: 
  · You have symptoms of infection, such as: 
? Increased pain, swelling, warmth, or redness. ? Red streaks leading from the area. ? Pus draining from the area. ? A fever.  
  · You have new or worse numbness, pain, or tingling in any part of your body.  
 Watch closely for changes in your health, and be sure to contact your doctor if: 
  · You have a new problem with your feet, such as: ? A new sore or ulcer. ? A break in the skin that is not healing after several days. ? Bleeding corns or calluses. ? An ingrown toenail.  
  · You do not get better as expected. Where can you learn more? Go to http://gwen-arturo.info/. Enter T137 in the search box to learn more about \"Diabetic Neuropathy: Care Instructions. \" 
 Current as of: December 7, 2017 Content Version: 11.8 © 6724-0493 [employer]. Care instructions adapted under license by [employer] (which disclaims liability or warranty for this information). If you have questions about a medical condition or this instruction, always ask your healthcare professional. [address] any warranty or liability for your use of this information.

## 2019-01-02 NOTE — PROGRESS NOTES
[facility] at [address] [facility], [address], [address] 
[phone] (E) [phone] (F)   
  
  2019 Name:  Liu Chi 
:  1926 MRN:  [id number] PCP:  Annamarie Torres MD  
 
 
Chief Complaint Chief Complaint Patient presents with  Follow-up  
  neuropathy Cliff Nichole is a 80 y.o. male who presents for follow up of peripheral neuropathy. He is accompanied by  his wife. History of Present Illness Patient's last visit was on 10/27/17. He takes Gabapentin 800 mg TID (was increased from 600 mg TID about 2 months ago) and he reports the increased dose has helped relieve his symptoms. He denies any side effects from the Gabapentin. He states he has trouble describing the feeling he gets in his feet, he states it is like \"he has a vice around his feet and he is trying to walk on a balloon or in a mud hole\". Symptoms in left foot are worse than his right. He tripped once about one month ago, but did not fall down. He denies any injury. He denies any numbness or tingling. He has occasional mild lower back pain after walking around, but he denies any current back pain. He states he has always had general weakness but he denies any change in this since last visit. He uses a cane to walk at home (he held on to his wife's wheelchair today when walking in). He still drives and denies any problem with this. He has borderline diabetes. He sees his PCP twice per year. Has arthritis in left shoulder and decreased ROM due to this. He denies any decline in his memory since his last visit. He denies any dizziness or headaches. The patient was a farmer and raised chicken and cattle. The patient states he can not have an MRI due to pacemaker placed about one year ago.   He knows about the mass in his lumbar spine and he states about two years ago he saw a spine specialist who offered injections in his spine, but he declined this. He does not want any surgery on his back or further evaluation of this. His last lumbar spine MRI was 5/5/16 and showed,  
1. Unchanged intradural extramedullary brightly enhancing mass at L3. This  
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  
paraganglioma less likely. The mass creates moderate to severe spinal  
stenosis at this level. 2. Unchanged grade 1 anterolisthesis of L4 on L5 with moderate spinal  
stenosis and mild left neural foraminal narrowing. 3. Unchanged mild spinal stenosis at L5-S1 with mild right neural foraminal  
narrowing. 4. Unchanged extrahepatic biliary dilatation. Current Outpatient Medications Medication Sig  
 gabapentin (NEURONTIN) 800 mg tablet Take 800 mg by mouth three (3) times daily.  cholecalciferol (VITAMIN D3) 1,000 unit cap Take 1,000 Units by mouth daily.  warfarin (COUMADIN) 5 mg tablet Take 5 mg by mouth daily.  ferrous sulfate (IRON) 325 mg (65 mg iron) EC tablet  furosemide (LASIX) 20 mg tablet 20 mg daily.  potassium chloride SA (MICRO-K) 10 mEq capsule Take 10 mEq by mouth daily.  digoxin (LANOXIN) 0.25 mg tablet Take 0.25 mg by mouth daily.  metoprolol (LOPRESSOR) 50 mg tablet Take 25 mg by mouth two (2) times a day.  terazosin (HYTRIN) 5 mg capsule Take 5 mg by mouth daily. Indications: HYPERTENSION  finasteride (PROSCAR) 5 mg tablet Take 5 mg by mouth daily. No current facility-administered medications for this visit. No Known Allergies Past Medical History:  
Diagnosis Date  Arrhythmia   
 hx of A-fib  Arthritis  Atrial fibrillation ([address] Dr Franc Soto [phone]  CAD (coronary artery disease)  [place] (hard of hearing) Bilateral hearing aids  Hypertension  Skin cancer Left ear Past Surgical History:  
Procedure Laterality Date  HX CATARACT REMOVAL    
 HX HERNIA REPAIR    
 x2  
 HX OTHER SURGICAL    
 lesion removed right ear  TOTAL KNEE ARTHROPLASTY Left Social History Socioeconomic History  Marital status:  Spouse name: Not on file  Number of children: Not on file  Years of education: Not on file  Highest education level: Not on file Social Needs  Financial resource strain: Not on file  Food insecurity - worry: Not on file  Food insecurity - inability: Not on file  Transportation needs - medical: Not on file  Transportation needs - non-medical: Not on file Occupational History  Not on file Tobacco Use  Smoking status: Former Smoker Last attempt to quit: 1970 Years since quittin.1  Smokeless tobacco: Never Used  Tobacco comment: cigar smoker Substance and Sexual Activity  Alcohol use: No  
 Drug use: No  
 Sexual activity: Not on file Other Topics Concern  Not on file Social History Narrative  Not on file Family History Problem Relation Age of Onset  Heart Disease Mother  Heart Disease Father  COPD Father  Cancer Brother  Other Brother MVA Review of Systems Review of Systems Constitutional: Negative for chills and fever. HENT: Positive for hearing loss. Negative for congestion and tinnitus. Eyes: Negative for blurred vision and double vision. Respiratory: Negative for cough and shortness of breath. Cardiovascular: Negative for chest pain, palpitations and leg swelling. Gastrointestinal: Negative for abdominal pain, constipation, nausea and vomiting. Genitourinary: Negative for dysuria and hematuria. Musculoskeletal: Positive for back pain. Negative for falls, myalgias and neck pain. Skin: Negative for itching and rash. Neurological: Positive for weakness. Negative for dizziness, tingling, tremors, focal weakness and headaches. Endo/Heme/Allergies: Does not bruise/bleed easily. Psychiatric/Behavioral: Negative for depression. The patient is not nervous/anxious. Physical Exam:  
 
Visit Vitals /78 Pulse 86 Ht 5' 10\" (1.778 m) Wt 182 lb (82.6 kg) SpO2 98% BMI 26.11 kg/m² General Exam 
 
Patient is alert in NAD, appears well nourished and well groomed. HEENT- head is normocephalic, sclera clear,  nose and throat are clear Neck- supple, no carotid bruit Chest - CTA A/P/L, full breath sounds bilaterally Heart - RRR, 1/6 systolic murmur LSB, no radiation Musculoskeletal- normal posture Extremities - warm and no edema Skin- no rashes or lesions Psychiatric- normal mood and bright affect 
  
Neurological Exam 
 
Alert and oriented to person, place and time. Speech is clear- no aphasia or dysarthria. Answers questions appropriately. Knows who the President it. Cranial nerves II-XII- visual acuity is grossly intact, PERRLA, EOMS intact, no nystagmus, no ptosis, facial sensation normal, no facial asymmetry, facial movements are symmetrical,  hearing decreased (not wearing his hearing aids), palate rises symmetrically, sternocleidomastoid strength 5/5 bilaterally, tongue midline. Motor System- strength 5/5 to upper and lower extremities bilaterally except for 4/5 right hip flexion, normal muscle bulk and tone, no tremor. Sensation- intact to light touch and pinprick throughout. decreased to temperature in feet. Coordination - finger to nose accurate, BECKY intact, Romberg negative, no pronator drift. Reflexes- 2+ to upper and lower extremities bilaterally except for 1+ to achilles bilaterally. Gait- unsteady (does not have his cane with him, he holds on to furniture every few steps)  
 
  
Assessment and Plan: ICD-10-CM ICD-9-CM 1. Diabetic peripheral neuropathy associated with type 2 diabetes mellitus (HCC) E11.42 250.60   
  357.2 2. Spinal stenosis of lumbar region at multiple levels M48.061 724.02   
  
1. Diabetic peripheral neuropathy associated with type 2 diabetes mellitus ([address] Continue Gabapentin 800 mg TID. 2. Spinal stenosis of lumbar region at multiple levels - Reviewed last lumbar MRI with Dr. David Monsivais who also spoke briefly with patient. History of moderate to severe spinal stenosis at L3 due to mass (probable ependymoma according to MRI in 2016). The patient declines any further imaging or treatment for this. 3. Follow up in one year or sooner if symptoms worsen in any way. Carmen Stafford, FNP-BC This note will not be viewable in [address]

## 2022-03-19 PROBLEM — R00.1 BRADYCARDIA: Status: ACTIVE | Noted: 2017-07-10

## 2023-05-26 RX ORDER — METOPROLOL TARTRATE 50 MG/1
25 TABLET, FILM COATED ORAL 2 TIMES DAILY
COMMUNITY

## 2023-05-26 RX ORDER — FUROSEMIDE 20 MG/1
20 TABLET ORAL DAILY
COMMUNITY
Start: 2016-04-07

## 2023-05-26 RX ORDER — LANOLIN ALCOHOL/MO/W.PET/CERES
CREAM (GRAM) TOPICAL
COMMUNITY
Start: 2016-02-23

## 2023-05-26 RX ORDER — TERAZOSIN 5 MG/1
5 CAPSULE ORAL DAILY
COMMUNITY

## 2023-05-26 RX ORDER — GABAPENTIN 800 MG/1
800 TABLET ORAL 3 TIMES DAILY
COMMUNITY
Start: 2018-11-01

## 2023-05-26 RX ORDER — FINASTERIDE 5 MG/1
5 TABLET, FILM COATED ORAL DAILY
COMMUNITY

## 2023-05-26 RX ORDER — DIGOXIN 250 MCG
0.25 TABLET ORAL DAILY
COMMUNITY

## 2023-05-26 RX ORDER — WARFARIN SODIUM 5 MG/1
5 TABLET ORAL DAILY
COMMUNITY

## 2023-05-26 RX ORDER — POTASSIUM CHLORIDE 750 MG/1
10 CAPSULE, EXTENDED RELEASE ORAL DAILY
COMMUNITY